# Patient Record
Sex: MALE | Race: WHITE | NOT HISPANIC OR LATINO | URBAN - NONMETROPOLITAN AREA
[De-identification: names, ages, dates, MRNs, and addresses within clinical notes are randomized per-mention and may not be internally consistent; named-entity substitution may affect disease eponyms.]

---

## 2017-11-10 ENCOUNTER — LAB (OUTPATIENT)
Dept: LAB | Facility: HOSPITAL | Age: 42
End: 2017-11-10

## 2017-11-10 ENCOUNTER — OFFICE VISIT (OUTPATIENT)
Dept: FAMILY MEDICINE CLINIC | Facility: CLINIC | Age: 42
End: 2017-11-10

## 2017-11-10 VITALS
WEIGHT: 143.4 LBS | BODY MASS INDEX: 22.51 KG/M2 | DIASTOLIC BLOOD PRESSURE: 82 MMHG | TEMPERATURE: 98.2 F | RESPIRATION RATE: 20 BRPM | OXYGEN SATURATION: 100 % | HEIGHT: 67 IN | HEART RATE: 59 BPM | SYSTOLIC BLOOD PRESSURE: 130 MMHG

## 2017-11-10 DIAGNOSIS — R53.83 FATIGUE, UNSPECIFIED TYPE: ICD-10-CM

## 2017-11-10 DIAGNOSIS — R74.8 ELEVATED CK: ICD-10-CM

## 2017-11-10 DIAGNOSIS — R25.2 CRAMPS, MUSCLE, GENERAL: Primary | ICD-10-CM

## 2017-11-10 DIAGNOSIS — R25.2 CRAMPS, MUSCLE, GENERAL: ICD-10-CM

## 2017-11-10 PROBLEM — Z85.828 HISTORY OF MOH'S MICROGRAPHIC SURGERY FOR SKIN CANCER: Status: ACTIVE | Noted: 2017-11-08

## 2017-11-10 PROBLEM — Z00.00 PREVENTATIVE HEALTH CARE: Status: ACTIVE | Noted: 2017-11-08

## 2017-11-10 PROBLEM — R63.4 ABNORMAL WEIGHT LOSS: Status: ACTIVE | Noted: 2017-11-08

## 2017-11-10 PROBLEM — Z13.6 SCREENING FOR CARDIOVASCULAR CONDITION: Status: ACTIVE | Noted: 2017-11-08

## 2017-11-10 PROBLEM — R53.82 CHRONIC FATIGUE: Status: ACTIVE | Noted: 2017-11-08

## 2017-11-10 PROBLEM — Z83.3 FAMILY HISTORY OF TYPE 2 DIABETES MELLITUS IN FATHER: Status: ACTIVE | Noted: 2017-11-08

## 2017-11-10 PROBLEM — R79.89 LOW TESTOSTERONE LEVEL IN MALE: Status: ACTIVE | Noted: 2017-11-08

## 2017-11-10 PROBLEM — Z98.52 HISTORY OF VASECTOMY: Status: ACTIVE | Noted: 2017-11-08

## 2017-11-10 PROBLEM — Z98.890 HISTORY OF MOH'S MICROGRAPHIC SURGERY FOR SKIN CANCER: Status: ACTIVE | Noted: 2017-11-08

## 2017-11-10 LAB
25(OH)D3 SERPL-MCNC: 39.7 NG/ML (ref 30–100)
ALBUMIN SERPL-MCNC: 4.9 G/DL (ref 3.4–4.8)
ALBUMIN UR-MCNC: <0.6 MG/L
ALBUMIN/GLOB SERPL: 1.6 G/DL (ref 1.1–1.8)
ALP SERPL-CCNC: 83 U/L (ref 38–126)
ALT SERPL W P-5'-P-CCNC: 39 U/L (ref 21–72)
ANION GAP SERPL CALCULATED.3IONS-SCNC: 14 MMOL/L (ref 5–15)
AST SERPL-CCNC: 43 U/L (ref 17–59)
BASOPHILS # BLD AUTO: 0.01 10*3/MM3 (ref 0–0.2)
BASOPHILS NFR BLD AUTO: 0.2 % (ref 0–2)
BILIRUB SERPL-MCNC: 0.9 MG/DL (ref 0.2–1.3)
BILIRUB UR QL STRIP: NEGATIVE
BUN BLD-MCNC: 10 MG/DL (ref 7–21)
BUN/CREAT SERPL: 9.4 (ref 7–25)
CALCIUM SPEC-SCNC: 9.8 MG/DL (ref 8.4–10.2)
CHLORIDE SERPL-SCNC: 102 MMOL/L (ref 95–110)
CK SERPL-CCNC: 354 U/L (ref 55–170)
CLARITY UR: CLEAR
CO2 SERPL-SCNC: 29 MMOL/L (ref 22–31)
COLOR UR: YELLOW
CREAT BLD-MCNC: 1.06 MG/DL (ref 0.7–1.3)
CREAT UR-MCNC: 46.8 MG/DL
DEPRECATED RDW RBC AUTO: 43.3 FL (ref 35.1–43.9)
EOSINOPHIL # BLD AUTO: 0.05 10*3/MM3 (ref 0–0.7)
EOSINOPHIL NFR BLD AUTO: 1.2 % (ref 0–7)
ERYTHROCYTE [DISTWIDTH] IN BLOOD BY AUTOMATED COUNT: 14.2 % (ref 11.5–14.5)
GFR SERPL CREATININE-BSD FRML MDRD: 77 ML/MIN/1.73 (ref 63–147)
GLOBULIN UR ELPH-MCNC: 3 GM/DL (ref 2.3–3.5)
GLUCOSE BLD-MCNC: 99 MG/DL (ref 60–100)
GLUCOSE UR STRIP-MCNC: NEGATIVE MG/DL
HCT VFR BLD AUTO: 40.6 % (ref 39–49)
HGB BLD-MCNC: 13.2 G/DL (ref 13.7–17.3)
HGB UR QL STRIP.AUTO: NEGATIVE
IMM GRANULOCYTES # BLD: 0.02 10*3/MM3 (ref 0–0.02)
IMM GRANULOCYTES NFR BLD: 0.5 % (ref 0–0.5)
KETONES UR QL STRIP: NEGATIVE
LEUKOCYTE ESTERASE UR QL STRIP.AUTO: NEGATIVE
LYMPHOCYTES # BLD AUTO: 1.35 10*3/MM3 (ref 0.6–4.2)
LYMPHOCYTES NFR BLD AUTO: 31.6 % (ref 10–50)
MCH RBC QN AUTO: 27.1 PG (ref 26.5–34)
MCHC RBC AUTO-ENTMCNC: 32.5 G/DL (ref 31.5–36.3)
MCV RBC AUTO: 83.4 FL (ref 80–98)
MICROALBUMIN/CREAT UR: NORMAL MG/G (ref 0–30)
MONOCYTES # BLD AUTO: 0.39 10*3/MM3 (ref 0–0.9)
MONOCYTES NFR BLD AUTO: 9.1 % (ref 0–12)
NEUTROPHILS # BLD AUTO: 2.45 10*3/MM3 (ref 2–8.6)
NEUTROPHILS NFR BLD AUTO: 57.4 % (ref 37–80)
NITRITE UR QL STRIP: NEGATIVE
PH UR STRIP.AUTO: 7.5 [PH] (ref 5–9)
PLATELET # BLD AUTO: 165 10*3/MM3 (ref 150–450)
PMV BLD AUTO: 12.1 FL (ref 8–12)
POTASSIUM BLD-SCNC: 4.5 MMOL/L (ref 3.5–5.1)
PROT SERPL-MCNC: 7.9 G/DL (ref 6.3–8.6)
PROT UR QL STRIP: NEGATIVE
RBC # BLD AUTO: 4.87 10*6/MM3 (ref 4.37–5.74)
SODIUM BLD-SCNC: 145 MMOL/L (ref 137–145)
SP GR UR STRIP: 1.01 (ref 1–1.03)
UROBILINOGEN UR QL STRIP: NORMAL
VIT B12 BLD-MCNC: 408 PG/ML (ref 239–931)
WBC NRBC COR # BLD: 4.27 10*3/MM3 (ref 3.2–9.8)

## 2017-11-10 PROCEDURE — 83735 ASSAY OF MAGNESIUM: CPT | Performed by: NURSE PRACTITIONER

## 2017-11-10 PROCEDURE — 82043 UR ALBUMIN QUANTITATIVE: CPT | Performed by: NURSE PRACTITIONER

## 2017-11-10 PROCEDURE — 82570 ASSAY OF URINE CREATININE: CPT | Performed by: NURSE PRACTITIONER

## 2017-11-10 PROCEDURE — 85025 COMPLETE CBC W/AUTO DIFF WBC: CPT | Performed by: NURSE PRACTITIONER

## 2017-11-10 PROCEDURE — 82607 VITAMIN B-12: CPT | Performed by: NURSE PRACTITIONER

## 2017-11-10 PROCEDURE — 36415 COLL VENOUS BLD VENIPUNCTURE: CPT

## 2017-11-10 PROCEDURE — 82550 ASSAY OF CK (CPK): CPT | Performed by: NURSE PRACTITIONER

## 2017-11-10 PROCEDURE — 86038 ANTINUCLEAR ANTIBODIES: CPT | Performed by: NURSE PRACTITIONER

## 2017-11-10 PROCEDURE — 82306 VITAMIN D 25 HYDROXY: CPT | Performed by: NURSE PRACTITIONER

## 2017-11-10 PROCEDURE — 86431 RHEUMATOID FACTOR QUANT: CPT | Performed by: NURSE PRACTITIONER

## 2017-11-10 PROCEDURE — 99203 OFFICE O/P NEW LOW 30 MIN: CPT | Performed by: NURSE PRACTITIONER

## 2017-11-10 PROCEDURE — 80053 COMPREHEN METABOLIC PANEL: CPT | Performed by: NURSE PRACTITIONER

## 2017-11-10 PROCEDURE — 81003 URINALYSIS AUTO W/O SCOPE: CPT | Performed by: NURSE PRACTITIONER

## 2017-11-10 RX ORDER — CYCLOBENZAPRINE HCL 10 MG
10 TABLET ORAL 3 TIMES DAILY PRN
Qty: 90 TABLET | Refills: 0 | Status: SHIPPED | OUTPATIENT
Start: 2017-11-10 | End: 2018-09-12

## 2017-11-10 NOTE — PROGRESS NOTES
Subjective   Jose Carlos Evans is a 42 y.o. male.  Establish primary care.  Here today to discuss generalized muscle cramps.  Began having cramping episodes since completing the [employer] Run on 09/09/2017.  Two days after the run became sick with an upper respiratory infection.  Had an 8 pound weight loss in the first two days despite no vomiting or diarrhea.  Lost a total of 14 pounds in 1 week.  Has put back on 5 pounds of the weight loss.  Can be brushing his teeth and his forearm will cramp or just barley flex his bicep at work and it will cramp immediately.   Was seen at [facility] by Dr. Garcia on 11/08/2017 form same symptoms and was told that his CK was elevated but then did not follow up on it.      Muscle Pain   This is a new problem. The current episode started more than 1 month ago. The problem occurs daily. The pain is present in the left forearm, right forearm, right hand, left hand, left upper leg, right upper leg, lower back, left lower leg and right lower leg. The pain is severe. Nothing aggravates the symptoms. Associated symptoms include fatigue and weakness. Pertinent negatives include no headaches. Treatments tried: Tried incrasing fluids. The treatment provided no relief. There is no swelling present. He has been behaving normally.        The following portions of the patient's history were reviewed and updated as appropriate: allergies, current medications, past family history, past medical history, past social history, past surgical history and problem list.    Review of Systems   Constitutional: Positive for fatigue and unexpected weight change.   HENT: Negative.    Eyes: Negative.    Respiratory: Negative.    Cardiovascular: Negative.    Gastrointestinal: Negative.    Genitourinary: Negative.    Musculoskeletal: Negative.    Skin: Negative.    Neurological: Positive for weakness. Negative for dizziness, light-headedness, numbness and headaches.   Psychiatric/Behavioral: Negative.         Objective   Physical Exam   Constitutional: He is oriented to person, place, and time. He appears well-developed and well-nourished.   HENT:   Head: Normocephalic.   Right Ear: External ear normal.   Left Ear: External ear normal.   Eyes: EOM are normal. Pupils are equal, round, and reactive to light.   Neck: Normal range of motion. Neck supple.   Cardiovascular: Normal rate, regular rhythm and normal heart sounds.    Pulmonary/Chest: Effort normal and breath sounds normal.   Abdominal: Soft. Bowel sounds are normal.   Musculoskeletal: Normal range of motion.   Neurological: He is alert and oriented to person, place, and time.   Skin: Skin is warm and dry.   Psychiatric: He has a normal mood and affect. His behavior is normal. Judgment and thought content normal.   Nursing note and vitals reviewed.      Assessment/Plan   Problems Addressed this Visit     None      Visit Diagnoses     Cramps, muscle, general    -  Primary    Relevant Orders    CBC & Differential (Completed)    Comprehensive metabolic panel (Completed)    ANT    Rheumatoid Factor    Microalbumin / Creatinine Urine Ratio - Urine, Clean Catch (Completed)    Urinalysis With / Culture If Indicated - Urine, Clean Catch (Completed)    Vitamin B12 (Completed)    Vitamin D 25 hydroxy (Completed)    CBC Auto Differential (Completed)    Elevated CK        Relevant Orders    CK (Completed)    Fatigue, unspecified type        Relevant Medications    cyclobenzaprine (FLEXERIL) 10 MG tablet    Other Relevant Orders    CBC & Differential (Completed)    Comprehensive metabolic panel (Completed)    ANT    Rheumatoid Factor    Urinalysis With / Culture If Indicated - Urine, Clean Catch (Completed)    Vitamin B12 (Completed)    Vitamin D 25 hydroxy (Completed)    CBC Auto Differential (Completed)        Muscle Cramps:  Complete lab work including CBC, COMP, CK, Miroalbumin/Creatnine Urine Ratio,  ANT, RF, U/A, Vitamin B 12 and Vitamin D  Begin Flexeril as  prescribed   Continue to promote hydration     Elevated CK;  Have repeat CK level drawn     Fatigue:  Complete lab work as mentioned above   Schedule follow-up appointment in 2 weeks for recheck and to discuss lab results          This document has been electronically signed by EDWINA Lopez on November 10, 2017 2:47 PM

## 2017-11-13 DIAGNOSIS — R25.2 CRAMPING OF FEET: Primary | ICD-10-CM

## 2017-11-13 LAB
ANA SER QL: NEGATIVE
MAGNESIUM SERPL-MCNC: 2.2 MG/DL (ref 1.6–2.3)

## 2017-11-14 ENCOUNTER — TELEPHONE (OUTPATIENT)
Dept: FAMILY MEDICINE CLINIC | Facility: CLINIC | Age: 42
End: 2017-11-14

## 2017-11-14 DIAGNOSIS — R74.8 ELEVATED CK: Primary | ICD-10-CM

## 2017-11-14 NOTE — TELEPHONE ENCOUNTER
Called Mr. Evans to discuss the results of his recent lab work.  Lorenzo was informed that his lab work was normal except for an elevated CK level.  Discussed with Lorenzo that his CK level has decreased since the previous visit at [facility] but is still elevated.  He does report some relief of muscle cramps with the Flexeril but is limited on being able to take the medication due to his job.  Last ordering more lab tests to include a sedimentation rate and he will come in to have labs drawn.  Discussed possibility of rheumatology referral pending the outcome of lab work.  Encouraged him to continue his medication routine at this time.  Lorenzo verbalized understanding.

## 2017-11-15 ENCOUNTER — LAB (OUTPATIENT)
Dept: LAB | Facility: HOSPITAL | Age: 42
End: 2017-11-15

## 2017-11-15 DIAGNOSIS — R74.8 ELEVATED CK: ICD-10-CM

## 2017-11-15 LAB
ERYTHROCYTE [SEDIMENTATION RATE] IN BLOOD: 7 MM/HR (ref 0–15)
RHEUMATOID FACT SERPL-ACNC: NEGATIVE [IU]/ML

## 2017-11-15 PROCEDURE — 85651 RBC SED RATE NONAUTOMATED: CPT | Performed by: NURSE PRACTITIONER

## 2017-11-15 PROCEDURE — 36415 COLL VENOUS BLD VENIPUNCTURE: CPT

## 2017-11-22 ENCOUNTER — OFFICE VISIT (OUTPATIENT)
Dept: FAMILY MEDICINE CLINIC | Facility: CLINIC | Age: 42
End: 2017-11-22

## 2017-11-22 VITALS
OXYGEN SATURATION: 99 % | HEART RATE: 73 BPM | RESPIRATION RATE: 20 BRPM | SYSTOLIC BLOOD PRESSURE: 100 MMHG | DIASTOLIC BLOOD PRESSURE: 80 MMHG | BODY MASS INDEX: 22.71 KG/M2 | WEIGHT: 144.7 LBS | HEIGHT: 67 IN | TEMPERATURE: 97.9 F

## 2017-11-22 DIAGNOSIS — R25.2 CRAMPS, MUSCLE, GENERAL: Primary | ICD-10-CM

## 2017-11-22 PROCEDURE — 99213 OFFICE O/P EST LOW 20 MIN: CPT | Performed by: NURSE PRACTITIONER

## 2017-11-22 RX ORDER — METAXALONE 800 MG/1
800 TABLET ORAL 3 TIMES DAILY PRN
Qty: 90 TABLET | Refills: 0 | Status: SHIPPED | OUTPATIENT
Start: 2017-11-22 | End: 2018-09-12

## 2017-11-22 NOTE — PROGRESS NOTES
Subjective   Jose Carlos Evans is a 42 y.o. male.  Here today for follow-up for chronic muscle cramps.  Seen in this office in November 10 for intermittent generalized muscle cramps that began back In September after completing a 5K run.  Multiple lab studies ordered at that time and patient placed on Flexeril for muscle cramps.  Reports that Flexeril does help him sleep at night in the cramps are no longer waking him up but is unable to take it during the night at work because it makes him drowsy.  There has been no improvement in the number of cramps that he is having.  Cramping in the hands and feet is the most common.  Cramping to forearms and lower legs and other body parts is more positional.  Has also begun to notice that his feet get colder easier and notices that his fingers get cold as well.  States the other night he was barefoot and open the door to let the dogs out when he came back in and went to bed he began cramping in his feet within 30 minutes.  Usually doesn't have cramps in his feet after falling asleep.  Complains of intermittent joint pain in his knuckles but no swelling.  A previous provider had suggested testing for Celiac Disease but he reports his cramping does not improve or worsen with food.     Muscle Pain   This is a chronic problem. The current episode started more than 1 month ago. The problem occurs 2 to 4 times per day. The problem is unchanged. Pain location: Cramps are generalized throught the body. The pain is medium. The symptoms are aggravated by any movement. Associated symptoms include fatigue and joint swelling. Pertinent negatives include no fever or stiffness. Past treatments include prescription NSAID and rest (Muscle Relaxers). The treatment provided mild relief. There is no swelling present.        The following portions of the patient's history were reviewed and updated as appropriate: allergies, current medications, past family history, past medical history, past  social history, past surgical history and problem list.    Review of Systems   Constitutional: Positive for fatigue. Negative for appetite change, fever and unexpected weight change.   HENT: Negative.    Respiratory: Negative.    Cardiovascular: Negative.    Gastrointestinal: Negative.    Endocrine: Positive for cold intolerance.   Genitourinary: Negative.    Musculoskeletal: Positive for joint swelling and myalgias. Negative for stiffness.   Skin: Negative.    Neurological: Negative.    Hematological: Negative.        Objective   Physical Exam   Constitutional: He is oriented to person, place, and time. He appears well-developed and well-nourished.   HENT:   Head: Normocephalic.   Right Ear: External ear normal.   Left Ear: External ear normal.   Eyes: Pupils are equal, round, and reactive to light.   Neck: Normal range of motion. Neck supple.   Cardiovascular: Normal rate, regular rhythm and normal heart sounds.    Pulmonary/Chest: Effort normal and breath sounds normal.   Abdominal: Soft. Bowel sounds are normal.   Musculoskeletal: Normal range of motion.   Neurological: He is alert and oriented to person, place, and time.   Skin: Skin is warm and dry.   Psychiatric: He has a normal mood and affect. His behavior is normal. Judgment and thought content normal.   Nursing note and vitals reviewed.      Assessment/Plan   Problems Addressed this Visit     None      Visit Diagnoses     Cramps, muscle, general    -  Primary    Relevant Medications    metaxalone (SKELAXIN) 800 MG tablet    Other Relevant Orders    CK    ACTH    Salivary Cortisol X3, Timed - Saliva,    Acetylcholine Receptor Antibody Panel        Continue on current medications as previously prescribed  Schedule follow-up appointment in 4 weeks     Cramps, Muscle, Generalized:  Complete lab work as ordered including CK, ACTH, Salivary Cortisol and Acetylcholine and clementina call with results when available  Continue on Flexeril as prescribed for bedtime  use  Begin prescription for Skelaxin as prescribed for use during awake hours  Continue to increase fluids to promote hydration  Discussed possible referrals to Rheumatology and Endocrinolgy pending results of most recently ordered lab tests        This document has been electronically signed by EDWINA Lopez on November 26, 2017 11:49 AM

## 2017-11-27 ENCOUNTER — LAB (OUTPATIENT)
Dept: LAB | Facility: HOSPITAL | Age: 42
End: 2017-11-27

## 2017-11-27 DIAGNOSIS — R25.2 CRAMPS, MUSCLE, GENERAL: ICD-10-CM

## 2017-11-27 LAB — CK SERPL-CCNC: 223 U/L (ref 55–170)

## 2017-11-27 PROCEDURE — 83519 RIA NONANTIBODY: CPT

## 2017-11-27 PROCEDURE — 82024 ASSAY OF ACTH: CPT | Performed by: NURSE PRACTITIONER

## 2017-11-27 PROCEDURE — 36415 COLL VENOUS BLD VENIPUNCTURE: CPT

## 2017-11-27 PROCEDURE — 82550 ASSAY OF CK (CPK): CPT | Performed by: NURSE PRACTITIONER

## 2017-11-28 LAB — ACTH PLAS-MCNC: 25 PG/ML (ref 7.2–63.3)

## 2017-11-30 ENCOUNTER — TELEPHONE (OUTPATIENT)
Dept: FAMILY MEDICINE CLINIC | Facility: CLINIC | Age: 42
End: 2017-11-30

## 2017-11-30 NOTE — TELEPHONE ENCOUNTER
Attempted to call Mr. Evans to discuss the recent results of his lab work, left message on his voicemail

## 2017-12-01 LAB
ACHR AB SER-SCNC: <0.03 NMOL/L (ref 0–0.24)
ACHR BLOCK AB/ACHR TOTAL SFR SER: 15 % (ref 0–25)
ACHR MOD AB/ACHR TOTAL SFR SER: <12 % (ref 0–20)

## 2017-12-04 ENCOUNTER — TELEPHONE (OUTPATIENT)
Dept: FAMILY MEDICINE CLINIC | Facility: CLINIC | Age: 42
End: 2017-12-04

## 2017-12-04 DIAGNOSIS — R25.2 MUSCLE CRAMP: Primary | ICD-10-CM

## 2017-12-04 NOTE — TELEPHONE ENCOUNTER
Per Jose Carlos Nunez was called regarding recent lab results. After several attempts to call with no answer a result card was mailed out to him.  Informed patient that a referral to Rheumatology was sent and will call him with an appt.

## 2017-12-05 ENCOUNTER — LAB (OUTPATIENT)
Dept: LAB | Facility: HOSPITAL | Age: 42
End: 2017-12-05

## 2017-12-05 ENCOUNTER — TELEPHONE (OUTPATIENT)
Dept: FAMILY MEDICINE CLINIC | Facility: CLINIC | Age: 42
End: 2017-12-05

## 2017-12-05 DIAGNOSIS — R25.2 CRAMPS, MUSCLE, GENERAL: Primary | ICD-10-CM

## 2017-12-05 PROCEDURE — 82533 TOTAL CORTISOL: CPT

## 2017-12-05 PROCEDURE — 82533 TOTAL CORTISOL: CPT | Performed by: NURSE PRACTITIONER

## 2017-12-05 NOTE — TELEPHONE ENCOUNTER
Called to speak with Mr. Evans concerning his recent lab results.  Jose Carlos was informed at that time that his acetylcholine and ACTH results were normal.  Also informed that his CK level is trending down but still elevated at 223.  He reports that the cramping has improved and is mainly just in his feet at this time.  He will bring by the cortisol swabs today to the lab.  Reminded that his rheumatology referral has been made and he will be contacted to schedule appointment.  If cramping continues to improve, he may decide to cancel appointment.

## 2017-12-08 LAB
CORTIS SAL-MCNC: 0.03 UG/DL
SALIVARY CORTISOL #2: 0.23 UG/DL
SALIVARY CORTISOL #3: 0.03 UG/DL

## 2017-12-09 LAB
CORTIS SAL-MCNC: 0.18 UG/DL
SALIVARY CORTISOL #2: 0.19 UG/DL
SALIVARY CORTISOL #3: 0.04 UG/DL

## 2017-12-12 ENCOUNTER — TELEPHONE (OUTPATIENT)
Dept: FAMILY MEDICINE CLINIC | Facility: CLINIC | Age: 42
End: 2017-12-12

## 2017-12-12 DIAGNOSIS — E34.9 HYPOTESTOSTERONEMIA: Primary | ICD-10-CM

## 2017-12-12 DIAGNOSIS — R74.8 ELEVATED CK: ICD-10-CM

## 2017-12-12 NOTE — TELEPHONE ENCOUNTER
Called Mr. Evans to discuss the results of his recent cortisol swabs.  Informed that sounds were normal.  Continues to report intermittent cramps but is controlled by the medication.  Referral placed to endocrinology for low testosterone level to be evaluated.  Referral to rheumatology will be placed on hold per patient request since cramping has improved.  He peak CK level in 3 weeks.  Matti verbalized understanding.

## 2017-12-13 ENCOUNTER — TELEPHONE (OUTPATIENT)
Dept: FAMILY MEDICINE CLINIC | Facility: CLINIC | Age: 42
End: 2017-12-13

## 2018-01-08 ENCOUNTER — OFFICE VISIT (OUTPATIENT)
Dept: FAMILY MEDICINE CLINIC | Facility: CLINIC | Age: 43
End: 2018-01-08

## 2018-01-08 VITALS
OXYGEN SATURATION: 98 % | HEIGHT: 67 IN | DIASTOLIC BLOOD PRESSURE: 78 MMHG | TEMPERATURE: 97.6 F | RESPIRATION RATE: 20 BRPM | BODY MASS INDEX: 24.03 KG/M2 | SYSTOLIC BLOOD PRESSURE: 100 MMHG | WEIGHT: 153.1 LBS | HEART RATE: 78 BPM

## 2018-01-08 DIAGNOSIS — L72.3 SEBACEOUS CYST: Primary | ICD-10-CM

## 2018-01-08 PROCEDURE — 10060 I&D ABSCESS SIMPLE/SINGLE: CPT | Performed by: NURSE PRACTITIONER

## 2018-01-08 PROCEDURE — 99213 OFFICE O/P EST LOW 20 MIN: CPT | Performed by: NURSE PRACTITIONER

## 2018-01-08 RX ORDER — SULFAMETHOXAZOLE AND TRIMETHOPRIM 800; 160 MG/1; MG/1
1 TABLET ORAL 2 TIMES DAILY
Qty: 14 TABLET | Refills: 0 | Status: SHIPPED | OUTPATIENT
Start: 2018-01-08 | End: 2018-01-15

## 2018-01-08 NOTE — PROGRESS NOTES
"Subjective   Jose Carlos Evans is a 42 y.o. male.  Has had a cyst to right trapezius area for the past 2 years.  Has been growing slowly over that time period bu over Smith became more tender and began having some drainage that was white and purulent in nature.  Feels tightness when first attempting to stretch his neck in the morning.  \"More aggregating than anything unless you push on it and then it is pretty tender.\"     HPI Comments: Cyst to the right side of the neck.  Has been there for 2 years and has been increasing in size.  Worse in the mornings when attempting to stretch his neck but does get better throughout the day.  Has not tried any treatments for this.  Recently had some spontaneous purulent drainage with no reduction in size of cyst.       The following portions of the patient's history were reviewed and updated as appropriate: allergies, current medications, past family history, past medical history, past social history, past surgical history and problem list.    Review of Systems   Constitutional: Negative.    HENT: Negative.    Musculoskeletal: Negative.    Skin: Positive for wound.   Neurological: Negative.        Objective   Physical Exam   Constitutional: He is oriented to person, place, and time. He appears well-developed and well-nourished.   HENT:   Head: Normocephalic.   Eyes: Pupils are equal, round, and reactive to light.   Neck: Normal range of motion.   Cardiovascular: Normal rate, regular rhythm and normal heart sounds.    Pulmonary/Chest: Effort normal and breath sounds normal.   Musculoskeletal: Normal range of motion.   Neurological: He is alert and oriented to person, place, and time.   Skin: Skin is warm and dry. There is erythema.        After obtaining signed consent, incision and drainage performed of sebaceous cyst of the right neck.  Area cleansed with ChloraPrep and Betadine under sterile technique.  5 cc of Xylocaine 1% the injected around the cyst.  Approximately 0.5 " centimeter puncture wound made using a  #10 blade scalpel.  Moderate amount of thick, , white cottage cheeselike drainage present as well as a moderate amount of serosanguineous drainage.  Area cleansed with normal saline and pressure dressing applied.   Nursing note and vitals reviewed.      Assessment/Plan   Problems Addressed this Visit     None      Visit Diagnoses     Sebaceous cyst    -  Primary    Relevant Medications    sulfamethoxazole-trimethoprim (BACTRIM DS,SEPTRA DS) 800-160 MG per tablet        1.  Sebaceous cyst:  Begin prescription for Bactrim as prescribed to be taken 1 by mouth twice a day ×7 days  Keep pressure dressing in place  Cleanse area with antibacterial soap and water pat dry replaced pressure dressing  Schedule follow-up appointment for reevaluation in this office in 48 hours        This document has been electronically signed by EDWINA Lopez on January 8, 2018 1:09 PM

## 2018-01-10 ENCOUNTER — OFFICE VISIT (OUTPATIENT)
Dept: FAMILY MEDICINE CLINIC | Facility: CLINIC | Age: 43
End: 2018-01-10

## 2018-01-10 VITALS
HEIGHT: 67 IN | WEIGHT: 153 LBS | BODY MASS INDEX: 24.01 KG/M2 | SYSTOLIC BLOOD PRESSURE: 100 MMHG | DIASTOLIC BLOOD PRESSURE: 80 MMHG | RESPIRATION RATE: 20 BRPM | TEMPERATURE: 97.7 F | HEART RATE: 83 BPM | OXYGEN SATURATION: 99 %

## 2018-01-10 DIAGNOSIS — L72.3 SEBACEOUS CYST: Primary | ICD-10-CM

## 2018-01-10 PROCEDURE — 99024 POSTOP FOLLOW-UP VISIT: CPT | Performed by: NURSE PRACTITIONER

## 2018-01-10 NOTE — PROGRESS NOTES
Subjective   Jose Carlos Evans is a 42 y.o. male.  48-hour recheck for incision and drainage of sebaceous cyst to right posterior neck.  Her last appointment with dressing in place.  Dressing is clean dry and intact.  Reports he is now able to move his neck without pain since having the incision and drainage done.  No fever or chills.  Is continuing to take his antibiotic as prescribed.    Wound Check   He was originally treated 2 to 3 days ago. Previous treatment included I&D of abscess. There has been no drainage from the wound. There is no redness present. There is no swelling present. There is no pain present. He has no difficulty moving the affected extremity or digit.        The following portions of the patient's history were reviewed and updated as appropriate: allergies, current medications, past family history, past medical history, past social history, past surgical history and problem list.    Review of Systems   Constitutional: Negative.    Cardiovascular: Negative.    Gastrointestinal: Negative.    Skin: Positive for wound.       Objective   Physical Exam   Constitutional: He is oriented to person, place, and time. He appears well-developed and well-nourished.   HENT:   Head: Normocephalic.   Neck: Normal range of motion.   Cardiovascular: Normal rate, regular rhythm and normal heart sounds.    Pulmonary/Chest: Effort normal and breath sounds normal.   Musculoskeletal: Normal range of motion.   Neurological: He is alert and oriented to person, place, and time.   Skin: Skin is warm and dry.        Nursing note and vitals reviewed.      Assessment/Plan   Problems Addressed this Visit     None      Visit Diagnoses     Sebaceous cyst    -  Primary        1.  Sebaceous cyst:  48-hour wound recheck after incision and drainage for sebaceous cyst of the right posterior neck  Continue on antibiotic as previously prescribed which was Bactrim DS to be taken 1 by mouth twice a day ×7 days  Continue to cleanse the  area with antibacterial soap and water and pat dry and may apply a light dressing  Increase in pain, warmth, redness or signs of infection contact this office immediately for follow-up appointment        This document has been electronically signed by EDWINA Lopez on January 10, 2018 11:47 AM

## 2018-03-28 ENCOUNTER — LAB (OUTPATIENT)
Dept: LAB | Facility: HOSPITAL | Age: 43
End: 2018-03-28

## 2018-03-28 ENCOUNTER — OFFICE VISIT (OUTPATIENT)
Dept: ENDOCRINOLOGY | Facility: CLINIC | Age: 43
End: 2018-03-28

## 2018-03-28 VITALS
DIASTOLIC BLOOD PRESSURE: 74 MMHG | OXYGEN SATURATION: 98 % | SYSTOLIC BLOOD PRESSURE: 128 MMHG | HEART RATE: 64 BPM | WEIGHT: 155.9 LBS | HEIGHT: 67 IN | BODY MASS INDEX: 24.47 KG/M2

## 2018-03-28 DIAGNOSIS — E55.9 VITAMIN D DEFICIENCY: ICD-10-CM

## 2018-03-28 DIAGNOSIS — E53.8 B12 DEFICIENCY: ICD-10-CM

## 2018-03-28 DIAGNOSIS — M60.9 MYOSITIS, UNSPECIFIED MYOSITIS TYPE, UNSPECIFIED SITE: ICD-10-CM

## 2018-03-28 DIAGNOSIS — E29.1 HYPOGONADISM IN MALE: Primary | ICD-10-CM

## 2018-03-28 DIAGNOSIS — E29.1 HYPOGONADISM IN MALE: ICD-10-CM

## 2018-03-28 LAB
25(OH)D3 SERPL-MCNC: 37.3 NG/ML (ref 30–100)
ALBUMIN SERPL-MCNC: 4.7 G/DL (ref 3.4–4.8)
ALBUMIN/GLOB SERPL: 1.7 G/DL (ref 1.1–1.8)
ALP SERPL-CCNC: 88 U/L (ref 38–126)
ALT SERPL W P-5'-P-CCNC: 45 U/L (ref 21–72)
ANION GAP SERPL CALCULATED.3IONS-SCNC: 14 MMOL/L (ref 5–15)
AST SERPL-CCNC: 54 U/L (ref 17–59)
BASOPHILS # BLD AUTO: 0.03 10*3/MM3 (ref 0–0.2)
BASOPHILS NFR BLD AUTO: 0.7 % (ref 0–2)
BILIRUB SERPL-MCNC: 1.3 MG/DL (ref 0.2–1.3)
BUN BLD-MCNC: 11 MG/DL (ref 7–21)
BUN/CREAT SERPL: 11.3 (ref 7–25)
CALCIUM SPEC-SCNC: 9.3 MG/DL (ref 8.4–10.2)
CHLORIDE SERPL-SCNC: 102 MMOL/L (ref 95–110)
CK SERPL-CCNC: 365 U/L (ref 55–170)
CO2 SERPL-SCNC: 28 MMOL/L (ref 22–31)
CREAT BLD-MCNC: 0.97 MG/DL (ref 0.7–1.3)
DEPRECATED RDW RBC AUTO: 41.7 FL (ref 35.1–43.9)
EOSINOPHIL # BLD AUTO: 0.16 10*3/MM3 (ref 0–0.7)
EOSINOPHIL NFR BLD AUTO: 3.8 % (ref 0–7)
ERYTHROCYTE [DISTWIDTH] IN BLOOD BY AUTOMATED COUNT: 13.9 % (ref 11.5–14.5)
ERYTHROCYTE [SEDIMENTATION RATE] IN BLOOD: 2 MM/HR (ref 0–15)
FSH SERPL-ACNC: 9.5 MIU/ML (ref 1.5–9.7)
GFR SERPL CREATININE-BSD FRML MDRD: 85 ML/MIN/1.73 (ref 63–147)
GLOBULIN UR ELPH-MCNC: 2.7 GM/DL (ref 2.3–3.5)
GLUCOSE BLD-MCNC: 93 MG/DL (ref 60–100)
HCT VFR BLD AUTO: 40.1 % (ref 39–49)
HGB BLD-MCNC: 13.7 G/DL (ref 13.7–17.3)
IMM GRANULOCYTES # BLD: 0.01 10*3/MM3 (ref 0–0.02)
IMM GRANULOCYTES NFR BLD: 0.2 % (ref 0–0.5)
IRON 24H UR-MRATE: 81 MCG/DL (ref 49–181)
IRON SATN MFR SERPL: 19 % (ref 20–55)
LH SERPL-ACNC: 4.08 MIU/ML
LYMPHOCYTES # BLD AUTO: 1.81 10*3/MM3 (ref 0.6–4.2)
LYMPHOCYTES NFR BLD AUTO: 42.8 % (ref 10–50)
MCH RBC QN AUTO: 28.1 PG (ref 26.5–34)
MCHC RBC AUTO-ENTMCNC: 34.2 G/DL (ref 31.5–36.3)
MCV RBC AUTO: 82.2 FL (ref 80–98)
MONOCYTES # BLD AUTO: 0.46 10*3/MM3 (ref 0–0.9)
MONOCYTES NFR BLD AUTO: 10.9 % (ref 0–12)
NEUTROPHILS # BLD AUTO: 1.76 10*3/MM3 (ref 2–8.6)
NEUTROPHILS NFR BLD AUTO: 41.6 % (ref 37–80)
NRBC BLD MANUAL-RTO: 0 /100 WBC (ref 0–0)
PLATELET # BLD AUTO: 177 10*3/MM3 (ref 150–450)
PMV BLD AUTO: 11.2 FL (ref 8–12)
POTASSIUM BLD-SCNC: 4.3 MMOL/L (ref 3.5–5.1)
PROT SERPL-MCNC: 7.4 G/DL (ref 6.3–8.6)
RBC # BLD AUTO: 4.88 10*6/MM3 (ref 4.37–5.74)
SODIUM BLD-SCNC: 144 MMOL/L (ref 137–145)
TIBC SERPL-MCNC: 419 MCG/DL (ref 261–462)
VIT B12 BLD-MCNC: 446 PG/ML (ref 239–931)
WBC NRBC COR # BLD: 4.23 10*3/MM3 (ref 3.2–9.8)

## 2018-03-28 PROCEDURE — 82550 ASSAY OF CK (CPK): CPT | Performed by: INTERNAL MEDICINE

## 2018-03-28 PROCEDURE — 84403 ASSAY OF TOTAL TESTOSTERONE: CPT | Performed by: INTERNAL MEDICINE

## 2018-03-28 PROCEDURE — 80053 COMPREHEN METABOLIC PANEL: CPT | Performed by: INTERNAL MEDICINE

## 2018-03-28 PROCEDURE — 85025 COMPLETE CBC W/AUTO DIFF WBC: CPT

## 2018-03-28 PROCEDURE — 84146 ASSAY OF PROLACTIN: CPT | Performed by: INTERNAL MEDICINE

## 2018-03-28 PROCEDURE — 83540 ASSAY OF IRON: CPT | Performed by: INTERNAL MEDICINE

## 2018-03-28 PROCEDURE — 83001 ASSAY OF GONADOTROPIN (FSH): CPT | Performed by: INTERNAL MEDICINE

## 2018-03-28 PROCEDURE — 84410 TESTOSTERONE BIOAVAILABLE: CPT | Performed by: INTERNAL MEDICINE

## 2018-03-28 PROCEDURE — 82670 ASSAY OF TOTAL ESTRADIOL: CPT | Performed by: INTERNAL MEDICINE

## 2018-03-28 PROCEDURE — 82607 VITAMIN B-12: CPT | Performed by: INTERNAL MEDICINE

## 2018-03-28 PROCEDURE — 36415 COLL VENOUS BLD VENIPUNCTURE: CPT | Performed by: INTERNAL MEDICINE

## 2018-03-28 PROCEDURE — 82306 VITAMIN D 25 HYDROXY: CPT | Performed by: INTERNAL MEDICINE

## 2018-03-28 PROCEDURE — 83002 ASSAY OF GONADOTROPIN (LH): CPT | Performed by: INTERNAL MEDICINE

## 2018-03-28 PROCEDURE — 99204 OFFICE O/P NEW MOD 45 MIN: CPT | Performed by: INTERNAL MEDICINE

## 2018-03-28 PROCEDURE — 83550 IRON BINDING TEST: CPT | Performed by: INTERNAL MEDICINE

## 2018-03-28 PROCEDURE — 85651 RBC SED RATE NONAUTOMATED: CPT | Performed by: INTERNAL MEDICINE

## 2018-03-28 RX ORDER — TESTOSTERONE CYPIONATE 200 MG/ML
INJECTION, SOLUTION INTRAMUSCULAR
Qty: 4 ML | Refills: 5 | Status: SHIPPED | OUTPATIENT
Start: 2018-03-28 | End: 2018-09-12

## 2018-03-28 NOTE — PROGRESS NOTES
Jose Carlos Evans is a 42 y.o. male who presents for  evaluation of   Chief Complaint   Patient presents with   • Hypogonadism       Referring provider    EDWINA Lopez  [address] [place] [address]    Primary Care Provider    EDWINA Lopez    Duration since age 39    Timing - Hypogonadism is Constant    Quality -  not controlled    Severity -  moderate    Complications - none    Current symptoms/problems  fatigue, weakness, malaise, low libido     Alleviating Factors: exercises       Past Medical History:   Diagnosis Date   • Hypogonadism in male 3/28/2018     Family History   Problem Relation Age of Onset   • Adrenal disorder Neg Hx      Social History   Substance Use Topics   • Smoking status: Never Smoker   • Smokeless tobacco: Current User     Types: Snuff   • Alcohol use No         Current Outpatient Prescriptions:   •  MAGNESIUM PO, Take  by mouth., Disp: , Rfl:   •  metaxalone (SKELAXIN) 800 MG tablet, Take 1 tablet by mouth 3 (Three) Times a Day As Needed for Muscle Spasms., Disp: 90 tablet, Rfl: 0  •  Multiple Vitamins-Minerals (MULTIVITAMIN ADULT PO), Take  by mouth., Disp: , Rfl:   •  ondansetron ODT (ZOFRAN-ODT) 4 MG disintegrating tablet, Take 1-2 tablets by mouth Every 8 (Eight) Hours As Needed for Nausea or Vomiting., Disp: 12 tablet, Rfl: 0  •  cyclobenzaprine (FLEXERIL) 10 MG tablet, Take 1 tablet by mouth 3 (Three) Times a Day As Needed for Muscle Spasms., Disp: 90 tablet, Rfl: 0  •  Testosterone Cypionate (DEPOTESTOTERONE CYPIONATE) 200 MG/ML injection, 100mg weekly, Provide(#4)18G,(#4)21Gneedles (#4)3mlsyringes q month, Disp: 4 mL, Rfl: 5    Review of Systems    Review of Systems   Constitutional: Positive for fatigue. Negative for activity change, appetite change, chills, diaphoresis, fever and unexpected weight change.   HENT: Negative for congestion, dental problem, drooling, ear discharge, ear pain, facial swelling, mouth sores, postnasal drip, rhinorrhea, sinus  "pressure, sore throat, tinnitus, trouble swallowing and voice change.    Eyes: Negative for photophobia, pain, discharge, redness, itching and visual disturbance.   Respiratory: Negative for apnea, cough, choking, chest tightness, shortness of breath, wheezing and stridor.    Cardiovascular: Negative for chest pain, palpitations and leg swelling.   Gastrointestinal: Negative for abdominal distention, abdominal pain, constipation, diarrhea, nausea and vomiting.   Endocrine: Negative for cold intolerance, heat intolerance, polydipsia, polyphagia and polyuria.        Low libido   Genitourinary: Negative for decreased urine volume, difficulty urinating, dysuria, flank pain, frequency, hematuria and urgency.   Musculoskeletal: Negative for arthralgias, back pain, gait problem, joint swelling, myalgias, neck pain and neck stiffness.   Skin: Negative for color change, pallor, rash and wound.   Allergic/Immunologic: Negative for immunocompromised state.   Neurological: Positive for weakness. Negative for dizziness, tremors, seizures, syncope, facial asymmetry, speech difficulty, light-headedness, numbness and headaches.   Hematological: Negative for adenopathy.   Psychiatric/Behavioral: Negative for agitation, behavioral problems, confusion, decreased concentration, dysphoric mood, hallucinations, self-injury, sleep disturbance and suicidal ideas. The patient is not nervous/anxious and is not hyperactive.         Objective:   /74 (BP Location: Left arm, Patient Position: Sitting, Cuff Size: Large Adult)   Pulse 64   Ht 170.2 cm (67\")   Wt 70.7 kg (155 lb 14.4 oz)   SpO2 98%   BMI 24.42 kg/m²     Physical Exam   Constitutional: He is oriented to person, place, and time. He appears well-developed and well-nourished. He is cooperative.   HENT:   Head: Normocephalic and atraumatic.   Right Ear: External ear normal.   Left Ear: External ear normal.   Nose: Nose normal.   Mouth/Throat: Oropharynx is clear and moist. No " oropharyngeal exudate.   Eyes: Conjunctivae and EOM are normal. Pupils are equal, round, and reactive to light. No scleral icterus. Right eye exhibits normal extraocular motion. Left eye exhibits normal extraocular motion.   Neck: Neck supple. No JVD present. No muscular tenderness present. No tracheal deviation, no edema and no erythema present. No thyromegaly present.   Cardiovascular: Normal rate, regular rhythm, normal heart sounds and intact distal pulses.  Exam reveals no gallop and no friction rub.    No murmur heard.  Pulmonary/Chest: Effort normal and breath sounds normal. No stridor. No respiratory distress. He has no decreased breath sounds. He has no wheezes. He has no rhonchi. He has no rales. He exhibits no tenderness.   Abdominal: Soft. Bowel sounds are normal. He exhibits no distension and no mass. There is no hepatomegaly. There is no tenderness. There is no rebound and no guarding. No hernia.   Musculoskeletal: Normal range of motion. He exhibits no edema, tenderness or deformity.   Lymphadenopathy:     He has no cervical adenopathy.   Neurological: He is alert and oriented to person, place, and time. He has normal reflexes. No cranial nerve deficit. He exhibits normal muscle tone. Coordination normal.   Skin: Skin is warm. No rash noted. No erythema. No pallor.   Psychiatric: He has a normal mood and affect. His behavior is normal. Judgment and thought content normal.   Nursing note and vitals reviewed.      Lab Review    Results for orders placed or performed during the hospital encounter of 01/22/18   POCT Influenza A/B   Result Value Ref Range    Rapid Influenza A Ag negative     Rapid Influenza B Ag negative     Internal Control Passed Passed    Lot Number [id number]     Expiration Date 6/19            Assessment/Plan       ICD-10-CM ICD-9-CM   1. Hypogonadism in male E29.1 257.2   2. Vitamin D deficiency E55.9 268.9   3. B12 deficiency E53.8 266.2        Start testosterone 100 mg IM weekly        1) Patient is male and 18 years of age or older - YES       2) Patient has two (2) morning pre-treatment testosterone levels below the lower limit of the normal testosterone reference range of the individual laboratory used -YES           No results found for: TESTFRE    No results found for: TESTOSTEROTT          3) Patient has primary hypogonadism or hypogonadotropic hypogonadism (further defined below) YES        Primary hypogonadism (congenital or acquired) caused by testicular failure due to one of the following: cryptorchidism, bilateral torsion, orchitis, vanishing testes syndrome, orchiectomy, Klinefelter’s syndrome, chemotherapy, toxic damage from alcohol or heavy metals NO        Hypogonadotropic hypogonadism: idiopathic gonadotropin or luteinizing hormone-releasing (LHRH) deficiency, pituitary-hypothalamic injury from tumors, trauma, or radiation YES     In his case idiopathic    I ruled out elevated prolactin, No results found for: PROLACTIN    I ruled out Hemochromatosis, No results found for: IRON, TIBC, FERRITIN        4) Patient has symptoms of hypogonadism - YES , this include low libido, loss of muscle mass and strength, impotence, shaving less often, depressive symptoms       5) Patient does not have: Breast or prostate cancer  - DOES NOT HAVE    Palpable prostate nodule or prostate-specific antigen (PSA) > 4ng/mL or an increase in PSA of more than 1.4 ng/ml in the past 12 months   DOES NOT HAVE   No results found for: PSA      Hematocrit > 50% to start or > 54% to continue  DOES NOT HAVE    Lab Results   Component Value Date    HGB 13.2 (L) 11/10/2017       Lab Results   Component Value Date    HCT 40.6 11/10/2017         Untreated severe obstructive sleep apnea DOES NOT HAVE    Severe lower urinary tract symptoms DOES NOT HAVE     Uncontrolled or poorly controlled heart failure DOES NOT HAVE         I reviewed and summarized records from EDWINA Lopez from 2017 and I reviewed /  ordered labs.     Orders Placed This Encounter   Procedures   • Comprehensive Metabolic Panel   • Estradiol   • FSH & LH   • Hematocrit   • Iron Profile   • Prolactin   • Testosterone   • Testosterone, Bioavailable (M)   • CK   • Vitamin B12   • Vitamin D 25 Hydroxy   • Sedimentation Rate         A copy of my note was sent to EDWINA Lopez    Please see my above opinion and suggestions.

## 2018-03-29 LAB
ESTRADIOL SERPL HS-MCNC: 25.8 PG/ML (ref 7.6–42.6)
PROLACTIN SERPL-MCNC: 11.6 NG/ML (ref 4–15.2)
TESTOST SERPL-MCNC: 382 NG/DL (ref 264–916)

## 2018-03-30 ENCOUNTER — APPOINTMENT (OUTPATIENT)
Dept: LAB | Facility: HOSPITAL | Age: 43
End: 2018-03-30

## 2018-03-30 LAB
ERYTHROCYTE [SEDIMENTATION RATE] IN BLOOD: 3 MM/HR (ref 0–15)
TSH SERPL DL<=0.05 MIU/L-ACNC: 1.34 MIU/ML (ref 0.46–4.68)

## 2018-03-30 PROCEDURE — 86235 NUCLEAR ANTIGEN ANTIBODY: CPT | Performed by: INTERNAL MEDICINE

## 2018-03-30 PROCEDURE — 84443 ASSAY THYROID STIM HORMONE: CPT | Performed by: INTERNAL MEDICINE

## 2018-03-30 PROCEDURE — 85651 RBC SED RATE NONAUTOMATED: CPT | Performed by: INTERNAL MEDICINE

## 2018-03-30 PROCEDURE — 86038 ANTINUCLEAR ANTIBODIES: CPT | Performed by: INTERNAL MEDICINE

## 2018-03-30 PROCEDURE — 82085 ASSAY OF ALDOLASE: CPT | Performed by: INTERNAL MEDICINE

## 2018-04-01 LAB
BIOAVAILABLE TESTOSTERONE, %: 46.5 %
BIOAVAILABLE TESTOSTERONE, S: 199 NG/DL
TESTOST SERPL-MCNC: 428 NG/DL

## 2018-04-02 LAB
ALDOLASE SERPL-CCNC: 5.7 U/L (ref 3.3–10.3)
ANA SER QL: NEGATIVE
Lab: NORMAL

## 2018-04-06 LAB — ENA: PM-SCL ANTIBODY*: <2 EU/ML

## 2018-04-25 LAB
JO-1 (WB)*: NEGATIVE
KU AB SER QL: NEGATIVE
Lab: NEGATIVE
MI2 AB SER QL: NEGATIVE
OJ*: NEGATIVE
PL-12*: NEGATIVE
PL-7*: NEGATIVE
PM-SCL 100*: NEGATIVE
PM-SCL 75*: NEGATIVE
RNP: 61.8 EU/ML
RO-52*: NEGATIVE
SIGNAL RECOGNITION PARTICLE*: NEGATIVE

## 2018-05-07 ENCOUNTER — PATIENT MESSAGE (OUTPATIENT)
Dept: ENDOCRINOLOGY | Facility: CLINIC | Age: 43
End: 2018-05-07

## 2018-05-07 DIAGNOSIS — M60.9 MYOSITIS, UNSPECIFIED MYOSITIS TYPE, UNSPECIFIED SITE: Primary | ICD-10-CM

## 2018-06-05 LAB — MI2 AB SER QL: NORMAL

## 2019-05-16 ENCOUNTER — OFFICE VISIT (OUTPATIENT)
Dept: FAMILY MEDICINE CLINIC | Facility: CLINIC | Age: 44
End: 2019-05-16

## 2019-05-16 ENCOUNTER — APPOINTMENT (OUTPATIENT)
Dept: LAB | Facility: HOSPITAL | Age: 44
End: 2019-05-16

## 2019-05-16 VITALS
RESPIRATION RATE: 16 BRPM | BODY MASS INDEX: 23.84 KG/M2 | HEIGHT: 67 IN | HEART RATE: 66 BPM | DIASTOLIC BLOOD PRESSURE: 64 MMHG | SYSTOLIC BLOOD PRESSURE: 98 MMHG | WEIGHT: 151.9 LBS

## 2019-05-16 DIAGNOSIS — G89.29 CHRONIC PAIN OF LEFT THUMB: ICD-10-CM

## 2019-05-16 DIAGNOSIS — M79.645 CHRONIC PAIN OF LEFT THUMB: ICD-10-CM

## 2019-05-16 DIAGNOSIS — R53.82 CHRONIC FATIGUE: Primary | ICD-10-CM

## 2019-05-16 LAB
25(OH)D3 SERPL-MCNC: 21.8 NG/ML (ref 30–100)
ALBUMIN SERPL-MCNC: 4.6 G/DL (ref 3.5–5.2)
ALBUMIN/GLOB SERPL: 2 G/DL
ALP SERPL-CCNC: 77 U/L (ref 39–117)
ALT SERPL W P-5'-P-CCNC: 35 U/L (ref 1–41)
ANION GAP SERPL CALCULATED.3IONS-SCNC: 13 MMOL/L
AST SERPL-CCNC: 40 U/L (ref 1–40)
BASOPHILS # BLD AUTO: 0.02 10*3/MM3 (ref 0–0.2)
BASOPHILS NFR BLD AUTO: 0.6 % (ref 0–1.5)
BILIRUB SERPL-MCNC: 0.9 MG/DL (ref 0.2–1.2)
BUN BLD-MCNC: 15 MG/DL (ref 6–20)
BUN/CREAT SERPL: 13.9 (ref 7–25)
CALCIUM SPEC-SCNC: 9 MG/DL (ref 8.6–10.5)
CHLORIDE SERPL-SCNC: 105 MMOL/L (ref 98–107)
CO2 SERPL-SCNC: 24 MMOL/L (ref 22–29)
CREAT BLD-MCNC: 1.08 MG/DL (ref 0.76–1.27)
DEPRECATED RDW RBC AUTO: 42 FL (ref 37–54)
EOSINOPHIL # BLD AUTO: 0.15 10*3/MM3 (ref 0–0.4)
EOSINOPHIL NFR BLD AUTO: 4.2 % (ref 0.3–6.2)
ERYTHROCYTE [DISTWIDTH] IN BLOOD BY AUTOMATED COUNT: 13.8 % (ref 12.3–15.4)
GFR SERPL CREATININE-BSD FRML MDRD: 75 ML/MIN/1.73
GLOBULIN UR ELPH-MCNC: 2.3 GM/DL
GLUCOSE BLD-MCNC: 84 MG/DL (ref 65–99)
HCT VFR BLD AUTO: 39.6 % (ref 37.5–51)
HGB BLD-MCNC: 13 G/DL (ref 13–17.7)
IMM GRANULOCYTES # BLD AUTO: 0.01 10*3/MM3 (ref 0–0.05)
IMM GRANULOCYTES NFR BLD AUTO: 0.3 % (ref 0–0.5)
LYMPHOCYTES # BLD AUTO: 1.64 10*3/MM3 (ref 0.7–3.1)
LYMPHOCYTES NFR BLD AUTO: 45.6 % (ref 19.6–45.3)
MCH RBC QN AUTO: 27.3 PG (ref 26.6–33)
MCHC RBC AUTO-ENTMCNC: 32.8 G/DL (ref 31.5–35.7)
MCV RBC AUTO: 83.2 FL (ref 79–97)
MONOCYTES # BLD AUTO: 0.44 10*3/MM3 (ref 0.1–0.9)
MONOCYTES NFR BLD AUTO: 12.2 % (ref 5–12)
NEUTROPHILS # BLD AUTO: 1.34 10*3/MM3 (ref 1.7–7)
NEUTROPHILS NFR BLD AUTO: 37.1 % (ref 42.7–76)
NRBC BLD AUTO-RTO: 0 /100 WBC (ref 0–0.2)
PLATELET # BLD AUTO: 152 10*3/MM3 (ref 140–450)
PMV BLD AUTO: 12.6 FL (ref 6–12)
POTASSIUM BLD-SCNC: 4.2 MMOL/L (ref 3.5–5.2)
PROT SERPL-MCNC: 6.9 G/DL (ref 6–8.5)
RBC # BLD AUTO: 4.76 10*6/MM3 (ref 4.14–5.8)
SODIUM BLD-SCNC: 142 MMOL/L (ref 136–145)
TSH SERPL DL<=0.05 MIU/L-ACNC: 2.07 MIU/ML (ref 0.27–4.2)
VIT B12 BLD-MCNC: >2000 PG/ML (ref 211–946)
WBC NRBC COR # BLD: 3.6 10*3/MM3 (ref 3.4–10.8)

## 2019-05-16 PROCEDURE — 99214 OFFICE O/P EST MOD 30 MIN: CPT | Performed by: NURSE PRACTITIONER

## 2019-05-16 PROCEDURE — 82607 VITAMIN B-12: CPT | Performed by: NURSE PRACTITIONER

## 2019-05-16 PROCEDURE — 85025 COMPLETE CBC W/AUTO DIFF WBC: CPT | Performed by: NURSE PRACTITIONER

## 2019-05-16 PROCEDURE — 84402 ASSAY OF FREE TESTOSTERONE: CPT | Performed by: NURSE PRACTITIONER

## 2019-05-16 PROCEDURE — 80053 COMPREHEN METABOLIC PANEL: CPT | Performed by: NURSE PRACTITIONER

## 2019-05-16 PROCEDURE — 82306 VITAMIN D 25 HYDROXY: CPT | Performed by: NURSE PRACTITIONER

## 2019-05-16 PROCEDURE — 84403 ASSAY OF TOTAL TESTOSTERONE: CPT | Performed by: NURSE PRACTITIONER

## 2019-05-16 PROCEDURE — 96372 THER/PROPH/DIAG INJ SC/IM: CPT | Performed by: NURSE PRACTITIONER

## 2019-05-16 PROCEDURE — 84443 ASSAY THYROID STIM HORMONE: CPT | Performed by: NURSE PRACTITIONER

## 2019-05-16 RX ORDER — TRIAMCINOLONE ACETONIDE 40 MG/ML
80 INJECTION, SUSPENSION INTRA-ARTICULAR; INTRAMUSCULAR ONCE
Status: COMPLETED | OUTPATIENT
Start: 2019-05-16 | End: 2019-05-16

## 2019-05-16 RX ORDER — CYANOCOBALAMIN 1000 UG/ML
1000 INJECTION, SOLUTION INTRAMUSCULAR; SUBCUTANEOUS ONCE
Status: COMPLETED | OUTPATIENT
Start: 2019-05-16 | End: 2019-05-16

## 2019-05-16 RX ADMIN — TRIAMCINOLONE ACETONIDE 80 MG: 40 INJECTION, SUSPENSION INTRA-ARTICULAR; INTRAMUSCULAR at 11:52

## 2019-05-16 RX ADMIN — CYANOCOBALAMIN 1000 MCG: 1000 INJECTION, SOLUTION INTRAMUSCULAR; SUBCUTANEOUS at 11:53

## 2019-05-16 NOTE — PROGRESS NOTES
"Subjective   Jose Carlos Evans is a 43 y.o. male.  For the past three months has been complaining fatigue.  \"I am so tired all the time.  But it have to get up for work I would sleep all day every day.\"  Six months ago began having left thumb pain.  No known injury.  \"I cannot even put any weight on it.  Skin where I cannot hold things.\"    Fatigue   This is a chronic problem. The current episode started more than 1 month ago. The problem occurs constantly. The problem has been gradually worsening. Associated symptoms include arthralgias and fatigue. Pertinent negatives include no chills, diaphoresis, fever, headaches or weakness. Nothing aggravates the symptoms. He has tried rest, relaxation, lying down and sleep for the symptoms. The treatment provided no relief.   Hand Pain    There was no injury mechanism. The pain is present in the left hand. The quality of the pain is described as aching. The pain radiates to the left arm. The pain is moderate. The pain has been constant since the incident. Associated symptoms include tingling. The symptoms are aggravated by movement and palpation.        The following portions of the patient's history were reviewed and updated as appropriate:     No current outpatient medications on file.     No current facility-administered medications for this visit.      Current Outpatient Medications on File Prior to Visit   Medication Sig   • [DISCONTINUED] budesonide (RINOCORT AQUA) 32 MCG/ACT nasal spray 2 sprays into the nostril(s) as directed by provider Daily.     No current facility-administered medications on file prior to visit.      He has No Known Allergies..    Review of Systems   Constitutional: Positive for fatigue. Negative for chills, diaphoresis and fever.   HENT: Negative.    Eyes: Negative.    Respiratory: Negative.    Cardiovascular: Negative.    Gastrointestinal: Negative.    Genitourinary: Negative.    Musculoskeletal: Positive for arthralgias.   Skin: Negative.  " "  Neurological: Positive for tingling. Negative for weakness and headaches.   Psychiatric/Behavioral: Negative for confusion.       Objective    Visit Vitals  BP 98/64   Pulse 66   Resp 16   Ht 170.2 cm (67\")   Wt 68.9 kg (151 lb 14.4 oz)   BMI 23.79 kg/m²       Physical Exam   Constitutional: He is oriented to person, place, and time. He appears well-developed and well-nourished.   HENT:   Head: Normocephalic.   Right Ear: External ear normal.   Left Ear: External ear normal.   Eyes: EOM are normal. Pupils are equal, round, and reactive to light.   Neck: Normal range of motion. Neck supple.   Cardiovascular: Normal rate, regular rhythm and normal heart sounds.   Pulmonary/Chest: Effort normal and breath sounds normal.   Abdominal: Soft. Bowel sounds are normal.   Musculoskeletal: Normal range of motion.        Hands:  Neurological: He is alert and oriented to person, place, and time.   Skin: Skin is warm. Capillary refill takes less than 2 seconds.   Psychiatric: He has a normal mood and affect. His behavior is normal.   Nursing note and vitals reviewed.      Assessment/Plan   Problems Addressed this Visit        Other    Chronic fatigue - Primary    Relevant Medications    cyanocobalamin injection 1,000 mcg (Completed)    Other Relevant Orders    CBC & Differential    Comprehensive Metabolic Panel    TSH    Vitamin B12    Vitamin D 25 hydroxy    Testosterone, Free, Total    CBC Auto Differential      Other Visit Diagnoses     Chronic pain of left thumb        Relevant Medications    triamcinolone acetonide (KENALOG-40) injection 80 mg (Completed)    Other Relevant Orders    XR Finger 2+ View Left        New Medications Ordered This Visit   Medications   • cyanocobalamin injection 1,000 mcg   • triamcinolone acetonide (KENALOG-40) injection 80 mg       1.  Chronic fatigue:  Complete CBC, chemistry panel, TSH, vitamin B12, vitamin D and testosterone level is ordered and will notify results when available  Encouraged " to increase fluids to promote hydration  Discussed possibility of adding tick titers if lab work comes back normal  Vitamin B12 1000 mcg given in office    2.  Chronic pain of left thumb:  Kenalog 80 mg given IM in office  Educated on possible side effects of long-term use of steroidal medications  Complete x-ray of the left thumb is ordered and will notify of results when available    Continue on current medications as previously prescribed   Return if symptoms worsen or fail to improve.        This document has been electronically signed by EDWINA Lopez on May 16, 2019 12:54 PM

## 2019-05-17 DIAGNOSIS — E55.9 VITAMIN D DEFICIENCY: Primary | ICD-10-CM

## 2019-05-17 RX ORDER — ERGOCALCIFEROL 1.25 MG/1
50000 CAPSULE ORAL WEEKLY
Qty: 12 CAPSULE | Refills: 3 | OUTPATIENT
Start: 2019-05-17 | End: 2020-01-14

## 2019-05-18 LAB
TESTOST FREE SERPL-MCNC: 7.7 PG/ML (ref 6.8–21.5)
TESTOST SERPL-MCNC: 465 NG/DL (ref 264–916)

## 2019-05-21 ENCOUNTER — TELEPHONE (OUTPATIENT)
Dept: FAMILY MEDICINE CLINIC | Facility: CLINIC | Age: 44
End: 2019-05-21

## 2019-05-21 NOTE — PROGRESS NOTES
Per EDWINA Allen, Mr. Rosenbaum has been called with recent Finger x-ray & Lab results & recommendations.  Continue current medications and follow-up as planned or sooner if any problems.

## 2019-05-21 NOTE — TELEPHONE ENCOUNTER
--Per EDWINA Allen, Mr. Rosenbaum has been called with recent Finger x-ray & Lab results & recommendations.  Continue current medications and follow-up as planned or sooner if any problems.      --- Message from EDWINA Lopez sent at 5/17/2019  9:19 AM CDT -----  X-ray of his finger was within normal limits.  I do believe this is a tendon inflammation.  Hopefully the steroid injection will help.  Lab work came back his sugar, sodium, potassium, kidney function and liver functions were within normal limits.  Thyroid and B12 level were also within normal limits.  Vitamin D level was extremely low at 21.8 which could account for some of his fatigue.  I have sent in a prescription vitamin D to his pharmacy and he will take this pill once a week.  The testosterone level was a send out so we will not have that back for couple more days.  Thank you.

## 2019-05-21 NOTE — TELEPHONE ENCOUNTER
Per EDWINA Allen, Mr. Rosenbaum has been called with recent Lab results & recommendations.  Continue current medications and follow-up as planned or sooner if any problems.    Mayra    Mr. Evans has agreed to having the additional labs drawn, The Monospot and Tick Titers.  He states he will probably come in tomorrow ans have them drawn.  He states it has been year since he has had a tick bite.  (Information sent to Mayra)      ----- Message from EDWINA Lopez sent at 5/21/2019  7:51 AM CDT -----  His testosterone levels were normal.  Can you please ask him if he can remember when the last time he may have had a tick bite was.  Even if it has been several years I think it would be a good idea to draw some tick titers.  It would also probably be a good idea to do a Monospot.  If he is agreeable to these labs I will put them in.  Thank you.

## 2019-05-22 ENCOUNTER — LAB (OUTPATIENT)
Dept: LAB | Facility: HOSPITAL | Age: 44
End: 2019-05-22

## 2019-05-22 DIAGNOSIS — R53.82 CHRONIC FATIGUE: ICD-10-CM

## 2019-05-22 DIAGNOSIS — R53.82 CHRONIC FATIGUE: Primary | ICD-10-CM

## 2019-05-22 LAB — HETEROPH AB SER QL LA: NEGATIVE

## 2019-05-22 PROCEDURE — 86618 LYME DISEASE ANTIBODY: CPT

## 2019-05-22 PROCEDURE — 86757 RICKETTSIA ANTIBODY: CPT

## 2019-05-22 PROCEDURE — 86308 HETEROPHILE ANTIBODY SCREEN: CPT

## 2019-05-22 PROCEDURE — 86666 EHRLICHIA ANTIBODY: CPT

## 2019-05-23 LAB — B BURGDOR IGG+IGM SER-ACNC: <0.91 ISR (ref 0–0.9)

## 2019-05-24 ENCOUNTER — TELEPHONE (OUTPATIENT)
Dept: FAMILY MEDICINE CLINIC | Facility: CLINIC | Age: 44
End: 2019-05-24

## 2019-05-24 LAB
R RICKETTSI IGG SER QL IA: NEGATIVE
R RICKETTSI IGM TITR SER: 0.58 INDEX (ref 0–0.89)

## 2019-05-24 NOTE — PROGRESS NOTES
Per EDWINA Allen, Mr. Evans  has been called with recent lab results & recommendations.  Continue current medications and follow-up as planned or sooner if any problems.    Mayra,   Mr. Evans states he does not snore and his wife agree's that he does not snore.

## 2019-05-29 LAB
A PHAGOCYTOPH IGM TITR SER IF: NEGATIVE {TITER}
CONV HGE IGG TITER: NEGATIVE
E CHAFFEENSIS IGG TITR SER IF: NEGATIVE {TITER}
E. CHAFFEENSIS (HME) IGM TITER: NEGATIVE

## 2019-05-29 NOTE — PROGRESS NOTES
Per EDWINA Allen, Mr. Evans has been called with recommendations.  Continue current medications and follow-up as planned or sooner if any problems.

## 2022-07-13 NOTE — TELEPHONE ENCOUNTER
-Per EDWINA Allen, Mr. Evans  has been called with recent lab results & recommendations.  Continue current medications and follow-up as planned or sooner if any problems.    Mayra,   Mr. Evans states he does not snore and his wife agree's that he does not snore.      ---- Message from EDWINA Lopez sent at 5/24/2019  8:17 AM CDT -----  His [place] spotted fever and his Lyme titers were both negative.  Please ask him if he snores?  All of his results have came back normal so far.  Another source of fatigue can be sleep apnea.  Most times with sleep apnea the patient snores.  If he does snore we can refer him to sleep medicine for sleep study.  Thank you.     66